# Patient Record
Sex: MALE | Race: OTHER | HISPANIC OR LATINO | URBAN - METROPOLITAN AREA
[De-identification: names, ages, dates, MRNs, and addresses within clinical notes are randomized per-mention and may not be internally consistent; named-entity substitution may affect disease eponyms.]

---

## 2024-07-28 ENCOUNTER — EMERGENCY (EMERGENCY)
Facility: HOSPITAL | Age: 24
LOS: 1 days | Discharge: HOME | End: 2024-07-28
Attending: STUDENT IN AN ORGANIZED HEALTH CARE EDUCATION/TRAINING PROGRAM | Admitting: EMERGENCY MEDICINE
Payer: SELF-PAY

## 2024-07-28 VITALS
WEIGHT: 160.06 LBS | DIASTOLIC BLOOD PRESSURE: 76 MMHG | HEIGHT: 66 IN | SYSTOLIC BLOOD PRESSURE: 117 MMHG | RESPIRATION RATE: 17 BRPM | TEMPERATURE: 98 F | OXYGEN SATURATION: 98 % | HEART RATE: 92 BPM

## 2024-07-28 PROCEDURE — 99284 EMERGENCY DEPT VISIT MOD MDM: CPT

## 2024-07-28 PROCEDURE — 70450 CT HEAD/BRAIN W/O DYE: CPT | Mod: 26,MC

## 2024-07-28 PROCEDURE — 70486 CT MAXILLOFACIAL W/O DYE: CPT | Mod: 26,MC

## 2024-07-28 PROCEDURE — 99284 EMERGENCY DEPT VISIT MOD MDM: CPT | Mod: 25

## 2024-07-28 PROCEDURE — 70450 CT HEAD/BRAIN W/O DYE: CPT | Mod: MC

## 2024-07-28 PROCEDURE — 72125 CT NECK SPINE W/O DYE: CPT | Mod: 26,MC

## 2024-07-28 PROCEDURE — 72125 CT NECK SPINE W/O DYE: CPT | Mod: MC

## 2024-07-28 PROCEDURE — 70486 CT MAXILLOFACIAL W/O DYE: CPT | Mod: MC

## 2024-07-28 RX ORDER — ACETAMINOPHEN 325 MG
650 TABLET ORAL ONCE
Refills: 0 | Status: COMPLETED | OUTPATIENT
Start: 2024-07-28 | End: 2024-07-28

## 2024-07-28 RX ADMIN — Medication 650 MILLIGRAM(S): at 11:11

## 2024-07-28 NOTE — ED ADULT TRIAGE NOTE - NS ED NURSE BANDS TYPE
Patient received to [address] [place] [address] room # 14  Ambulatory from [facility]. Patient scheduled for C today with Dr Marianna Castaneda. Procedure reviewed & questions answered, voiced good understanding consent obtained & placed on chart. All medications and medical history reviewed. Will prep patient per orders. Patient & family updated on plan of care. The patient has a fraility score of 3-MANAGING WELL, based on ambulation.      Patient took Aspirin 324 today at 0630 prior to arrival. Name band;

## 2024-07-28 NOTE — ED ADULT NURSE NOTE - OBJECTIVE STATEMENT
hit in face by horses head yesterday. No LOC but dazed. Skin warm dry color pink, lip lac with dry scab, mouth tender.

## 2024-07-28 NOTE — ED ADULT NURSE NOTE - NS ED NOTE ABUSE SUSPICION NEGLECT YN
Attempted to call pt back about billing but no response at this time. Left VM to call office when available.    No

## 2024-07-28 NOTE — ED PROVIDER NOTE - PHYSICAL EXAMINATION
VITAL SIGNS: I have reviewed nursing notes and confirm.   GEN: Well-developed; well-nourished; in no acute distress. Speaking full sentences. GCS 15  SKIN: Warm, pink, no rash, no diaphoresis, no cyanosis, well perfused.   HEAD: Normocephalic;  No scalp lacerations, (+)  lower lip mild abrasion without laceration, no intraoral laceration, no loose dentition.  Mild ttp  to the bilateral jaws, able to open and close the jaw fully but with pain.  NECK: Supple; non tender.  NO midline ttp,  NO step offs,  Full ROM w/o pain.  EYES: Pupils 3mm equal, round, reactive to light and accomodation, conjunctiva and sclera clear. Extra-ocular movements intact bilaterally.  ENT: No nasal discharge; airway clear. Trachea is midline. ORAL: No oropharyngeal exudates or erythema. Normal dentition.  CV: RRR. S1, S2 normal; no murmurs, gallops, or rubs. Capillary refill < 2 seconds throughout. Distal pulses intact 2+ throughout.  NO chest wall TTP.  RESP: CTA bilaterally. No wheezes, rales, or rhonchi.   ABD: Normal bowel sounds, soft, non-distended, non-tender, no rebound, no guarding, no rigidity, no hepatosplenomegaly.  MSK: Normal range of motion and movement of all 4 extremities. No joint or muscular pain throughout. No clubbing.    BACK:  NO thoracolumbar midline TTP,  NO parathoracic TTP,  NO paralumbar TTP. No step-offs or obvious deformities.   NEURO: Alert & oriented x 3, Grossly unremarkable. Sensory and motor intact throughout. No focal deficits. Gait: Fluid. Normal speech and coordination.

## 2024-07-28 NOTE — ED PROVIDER NOTE - NSFOLLOWUPCLINICS_GEN_ALL_ED_FT
[facility]  Family Medicine  [address] [place] [id number]  Phone: [phone]  Fax:

## 2024-07-28 NOTE — ED PROVIDER NOTE - CLINICAL SUMMARY MEDICAL DECISION MAKING FREE TEXT BOX
Dr. Jenaro Duke MD, EM [facility] Medical Toxicology Attending:  Pt w/ XX  History obtained from independent historian: N/A  External note reviewed: N/A  DDx includes but is not limited to: XXX  Decision making, ED course, independent interpretation of imaging studies, and consults:   -     Consideration hospitalization vs de-escalation of care: XXX  Disposition: DC  ADMIT Dr. Jenaro Duke MD, [facility] T[facility]ology Attendin-year-old male with no past medical history no anticoagulation presenting with facial and mouth pain status post horse head hitting his face today.  Has some mild abrasion to his lower lip associated with mild pain achiness.  Mild achy pain when opening and closing the jaw. Otherwise: denies any chest pain, abdominal pain, shortness of breath, nausea/vomiting,   neck pain, back pain, hip pain, other extremity injury, LOC, syncope, lightheadedness, anticoagulation use, headaches, visual complaints, rashes, fevers/chills, difficulty ambulating,  lacerations, subjective neurological deficits, numbness/tingling, loose dentition, tongue laceration.  History obtained from independent historian: N/A  External note reviewed: N/A  DDx includes but is not limited to: rule out traumatic injuries  Decision making, ED course, independent interpretation of imaging studies, and consults:   -   CT head, neck, face.  Pain control with ibuprofen.      11:30 AM CT results reviewed, incidental finding in the cavernous sinus.  Advised the patient to follow-up with his primary care doctor for further evaluation with an MRI.  The patient verbalizes understanding.  Educated on return precautions.    Consideration hospitalization vs de-escalation of care: XXX  Disposition: DC  ADMIT

## 2024-07-28 NOTE — ED PROVIDER NOTE - NSFOLLOWUPINSTRUCTIONS_ED_ALL_ED_FT
Descanse, brianna muchos líquidos.  Avanzar en la actividad según se tolere.  Continúe con todos los medicamentos recetados anteriormente según las indicaciones.  Raffy un seguimiento con lima PMD 2-3 días y traiga copias de jackie resultados.  Regrese a la ladan de emergencias si los síntomas empeoran, simi de criselda, vómitos, dolor de masood, debilidad, entumecimiento/hormigueo o nuevos síntomas preocupantes.    [place] 650 mg de acetaminofén por vía oral cada 6 a 8 horas para controlar el dolor según sea necesario. No exceda los 4000 mg de paracetamol tavo un período de 24 horas. El acetaminofén se puede encontrar en muchos medicamentos para el resfriado de venta rand, así hugh en medicamentos opioides que se pueden administrar para el dolor.    [place] ibuprofeno (también conocido hugh MOTRIN o ADVIL) 400 mg por vía oral cada 6 a 8 horas para controlar el dolor según sea necesario con alimentos para evitar malestar estomacal. El ibuprofeno se puede encontrar en muchos medicamentos de venta rand. No tome ibuprofeno si tiene un trastorno hemorrágico, úlcera estomacal o gastrointestinal o enfermedad hepática.    Si es necesario, puede alternar estos medicamentos para poder quinn un medicamento cada 3 horas. Por ejemplo, al mediodía tome ibuprofeno, luego a las 3 p. m. tome paracetamol y luego a las 6 p. m. tome ibuprofeno.        Rest, drink plenty of fluids.  Advance activity as tolerated.  Continue all previously prescribed medications as directed.  Follow up with your PMD 2-3 days and bring copies of your results.  Return to the ER for worsening symptoms, headaches, vomiting, neck pain, weakness, numbness/tingling or new concerning symptoms.    Take acetaminophen 650 mg orally every 6-8 hours for pain control as needed. Please do not exceed 4,000 mg of acetaminophen during a 24 hours period. Acetaminophen can be found in many over-the-counter cold medications as well as opioid medications that may be given for pain.    Take ibuprofen (also known as MOTRIN or ADVIL) 400 mg orally every 6-8 hours for pain control as needed with food to avoid an upset stomach. Ibuprofen can be found in many over-the-counter medications. Please do not take ibuprofen if you have a bleeding disorder, stomach or gastrointestinal ulcer, or liver disease.    If needed, you can alternate these medications so that you can take one medication every 3 hours. For example, at noon take ibuprofen, then at 3PM take acetaminophen, then at 6PM take ibuprofen.

## 2024-07-28 NOTE — ED PROVIDER NOTE - PATIENT PORTAL LINK FT
You can access the FollowMyHealth Patient Portal offered by [facility] by registering at the following website: [email or web address]. By joining [employer]’s FollowMyHealth portal, you will also be able to view your health information using other applications (apps) compatible with our system.

## 2024-07-28 NOTE — ED PROVIDER NOTE - OBJECTIVE STATEMENT
24-year-old male with no past medical history no anticoagulation presenting with facial and mouth pain status post horse head hitting his face today.  Has some mild abrasion to his lower 24-year-old male with no past medical history no anticoagulation presenting with facial and mouth pain status post horse head hitting his face today.  Has some mild abrasion to his lower lip associated with mild pain achiness.  Mild achy pain when opening and closing the jaw. Otherwise: denies any chest pain, abdominal pain, shortness of breath, nausea/vomiting,   neck pain, back pain, hip pain, other extremity injury, LOC, syncope, lightheadedness, anticoagulation use, headaches, visual complaints, rashes, fevers/chills, difficulty ambulating,  lacerations, subjective neurological deficits, numbness/tingling, loose dentition, tongue laceration.

## 2024-11-14 NOTE — ED PROVIDER NOTE - IV ALTEPLASE INCLUSION HIDDEN
[facility]    Humiliation, Afraid, Rape, and Kick questionnaire     Fear of Current or Ex-Partner: No     Emotionally Abused: No     Physically Abused: No     Sexually Abused: No   Housing Stability: Unknown (12/22/2023)    Housing Stability Vital Sign     Unable to Pay for Housing in the Last Year: No     Number of Times Moved in the Last Year: Not on file     Homeless in the Last Year: Not on file     Family History   Problem Relation Age of Onset    Inflam Bowel Dis Son     Parkinsonism Mother     Suicide Brother     Heart Disease Brother 65        STENT HEART    Heart Disease Father         BOWEL OBSTRUCTION    Heart Attack Father 89        MI    Hypertension Father     Malig Hypertherm Neg Hx     Pseudochol. Deficiency Neg Hx     Delayed Awakening Neg Hx     Post-op Nausea/Vomiting Neg Hx     Emergence Delirium Neg Hx     Post-op Cognitive Dysfunction Neg Hx        UA - Dipstick  Results for orders placed or performed in visit on 10/17/24   AMB POC URINALYSIS DIP STICK AUTO W/O MICRO   Result Value Ref Range    Color (UA POC)      Clarity (UA POC)      Glucose, Urine, POC >=1000 Negative mg/dL    Bilirubin, Urine, POC Negative Negative    KETONES, Urine, POC Negative Negative mg/dL    Specific Gravity, Urine, POC 1.010 1.001 - 1.035    Blood (UA POC) Trace     pH, Urine, POC 5.5 4.6 - 8.0    Protein, Urine, POC Negative Negative mg/dL    Urobilinogen, POC 0.2 mg/dL <1.1 mg/dL    Nitrite, Urine, POC Negative Negative    Leukocyte Esterase, Urine, POC Negative Negative       UA - Micro  WBC - 0  RBC - 0  Bacteria - 0  Epith - 0      There were no vitals taken for this visit.     GENERAL: No acute distress, Awake, Alert, Oriented X 3, Gait normal  CARDIAC: regular rate and rhythm  CHEST AND LUNG: Easy work of breathing, clear to auscultation bilaterally, no cyanosis  ABDOMEN: soft, non tender, non-distended, positive bowel sounds, no organomegaly, no palpable masses, no guarding, no 
show